# Patient Record
Sex: MALE | URBAN - METROPOLITAN AREA
[De-identification: names, ages, dates, MRNs, and addresses within clinical notes are randomized per-mention and may not be internally consistent; named-entity substitution may affect disease eponyms.]

---

## 2021-01-15 ENCOUNTER — OFFICE VISIT (OUTPATIENT)
Dept: URBAN - METROPOLITAN AREA CLINIC 41 | Facility: CLINIC | Age: 66
End: 2021-01-15
Payer: MEDICARE

## 2021-01-15 DIAGNOSIS — H43.813 VITREOUS DEGENERATION, BILATERAL: ICD-10-CM

## 2021-01-15 DIAGNOSIS — H25.13 AGE-RELATED NUCLEAR CATARACT, BILATERAL: ICD-10-CM

## 2021-01-15 PROCEDURE — 67110 REPAIR DETACHED RETINA: CPT | Performed by: OPHTHALMOLOGY

## 2021-01-15 PROCEDURE — 92134 CPTRZ OPH DX IMG PST SGM RTA: CPT | Performed by: OPHTHALMOLOGY

## 2021-01-15 PROCEDURE — 99204 OFFICE O/P NEW MOD 45 MIN: CPT | Performed by: OPHTHALMOLOGY

## 2021-01-15 ASSESSMENT — INTRAOCULAR PRESSURE
OS: 15
OD: 13

## 2021-01-15 NOTE — IMPRESSION/PLAN
Impression: Retinal detachment with single break, right eye: H33.011. OCT OU = no SRF/IRF OU Plan: Rec staged repair with intravitreal gas injection today, followed by vitrectomy/EL/gas. RBAC's d/w patient in detail who agrees. Intravitreal gas injection with AC tap performed today with aseptic conditions. 0.3 cc of 100% C3F8 injected without complications after betadine prep. 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 We discussed the natural history and the risks and benefit of repairing the RD, which includes laser, pneumatic retinopexy, scleral buckle surgery, and/or vitrectomy surgery. The vision usually improves gradually over a period of several months to 1 year, but usually does not improve to normal. With RD repair, hopefully we can reduce the risk of further visual loss. Risks of surgery include but are not limited to loss of eye, blindness, infection, scar tissue formation, recurrent retinal tears and detachment, glaucoma, change in refractive error, ptosis, need for further surgery, epiretinal membranes, CME and use of gas or silicone oil. The patient understands that some important surgical tasks may be performed by a fellow under my direct supervision, and consents to such. The patient elects to proceed with vitrectomy surgery. 

PLAN: 25g PPV/EL/poss cryo/Gas x RD OD

## 2021-01-15 NOTE — IMPRESSION/PLAN
Impression: Age-related nuclear cataract, bilateral: H25.13. Plan: Stable from retina perspective for cataract surgery. D/w patient that retinal/macular pathology may limit final visual outcome. Patient voices understanding.

## 2021-01-18 ENCOUNTER — Encounter (OUTPATIENT)
Dept: URBAN - METROPOLITAN AREA EXTERNAL CLINIC 14 | Facility: EXTERNAL CLINIC | Age: 66
End: 2021-01-18
Payer: MEDICARE

## 2021-01-18 PROCEDURE — 67108 REPAIR DETACHED RETINA: CPT | Performed by: OPHTHALMOLOGY

## 2021-01-18 ASSESSMENT — INTRAOCULAR PRESSURE
OS: 17
OD: 13
OD: 12

## 2021-01-19 ENCOUNTER — POST-OPERATIVE VISIT (OUTPATIENT)
Dept: URBAN - METROPOLITAN AREA CLINIC 27 | Facility: CLINIC | Age: 66
End: 2021-01-19
Payer: MEDICARE

## 2021-01-19 PROCEDURE — 99024 POSTOP FOLLOW-UP VISIT: CPT | Performed by: OPHTHALMOLOGY

## 2021-01-19 ASSESSMENT — INTRAOCULAR PRESSURE
OD: 10
OS: 13

## 2021-01-19 NOTE — IMPRESSION/PLAN
Impression: S/P 25g PPV/EL/poss cryo/Gas x RD OD - 1 Day. Retinal detachment with single break, right eye  H33.011. Plan: Doing well. Start Rx and positioning. Alt precautions. 

Return in 1 silvano ([place])

## 2021-01-26 ENCOUNTER — POST-OPERATIVE VISIT (OUTPATIENT)
Dept: URBAN - METROPOLITAN AREA CLINIC 41 | Facility: CLINIC | Age: 66
End: 2021-01-26
Payer: MEDICARE

## 2021-01-26 DIAGNOSIS — H33.011 RETINAL DETACHMENT WITH SINGLE BREAK, RIGHT EYE: Primary | ICD-10-CM

## 2021-01-26 PROCEDURE — 99024 POSTOP FOLLOW-UP VISIT: CPT | Performed by: OPHTHALMOLOGY

## 2021-01-26 ASSESSMENT — INTRAOCULAR PRESSURE
OS: 18
OD: 14

## 2021-01-26 NOTE — IMPRESSION/PLAN
Impression: S/P 25g PPV/EL/poss cryo/Gas x RD OD - 8 Days. Retinal detachment with single break, right eye  H33.011. Plan: Fully attached.   Taper Rx.

2-3 weeks with [facility] (pt would like to travel home)

## 2021-02-08 ENCOUNTER — POST-OPERATIVE VISIT (OUTPATIENT)
Dept: URBAN - METROPOLITAN AREA CLINIC 41 | Facility: CLINIC | Age: 66
End: 2021-02-08
Payer: MEDICARE

## 2021-02-08 PROCEDURE — 99024 POSTOP FOLLOW-UP VISIT: CPT | Performed by: OPHTHALMOLOGY

## 2021-02-08 ASSESSMENT — INTRAOCULAR PRESSURE
OD: 23
OS: 19